# Patient Record
Sex: MALE | Race: WHITE | Employment: STUDENT | URBAN - METROPOLITAN AREA
[De-identification: names, ages, dates, MRNs, and addresses within clinical notes are randomized per-mention and may not be internally consistent; named-entity substitution may affect disease eponyms.]

---
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
## 2025-01-20 ENCOUNTER — E-ADVICE (OUTPATIENT)
Dept: ADMINISTRATIVE | Age: 20
End: 2025-01-20

## 2025-02-25 ENCOUNTER — E-ADVICE (OUTPATIENT)
Dept: ENDOCRINOLOGY | Age: 20
End: 2025-02-25

## 2025-03-04 ENCOUNTER — OFFICE VISIT (OUTPATIENT)
Dept: FAMILY MEDICINE CLINIC | Facility: CLINIC | Age: 20
End: 2025-03-04
Payer: COMMERCIAL

## 2025-03-04 VITALS
OXYGEN SATURATION: 98 % | RESPIRATION RATE: 16 BRPM | HEIGHT: 70 IN | WEIGHT: 220 LBS | BODY MASS INDEX: 31.5 KG/M2 | DIASTOLIC BLOOD PRESSURE: 84 MMHG | HEART RATE: 96 BPM | SYSTOLIC BLOOD PRESSURE: 120 MMHG

## 2025-03-04 DIAGNOSIS — E23.0 GROWTH HORMONE DEFICIENCY (HCC): ICD-10-CM

## 2025-03-04 DIAGNOSIS — F41.9 ANXIETY: ICD-10-CM

## 2025-03-04 DIAGNOSIS — J02.9 ACUTE PHARYNGITIS, UNSPECIFIED ETIOLOGY: ICD-10-CM

## 2025-03-04 DIAGNOSIS — E03.9 HYPOTHYROIDISM, UNSPECIFIED TYPE: ICD-10-CM

## 2025-03-04 DIAGNOSIS — J01.10 ACUTE NON-RECURRENT FRONTAL SINUSITIS: Primary | ICD-10-CM

## 2025-03-04 DIAGNOSIS — J01.10 ACUTE NON-RECURRENT FRONTAL SINUSITIS: ICD-10-CM

## 2025-03-04 LAB
CONTROL LINE PRESENT WITH A CLEAR BACKGROUND (YES/NO): YES YES/NO
KIT LOT #: NORMAL NUMERIC
STREP GRP A CUL-SCR: NEGATIVE

## 2025-03-04 PROCEDURE — 87880 STREP A ASSAY W/OPTIC: CPT | Performed by: NURSE PRACTITIONER

## 2025-03-04 PROCEDURE — 3079F DIAST BP 80-89 MM HG: CPT | Performed by: NURSE PRACTITIONER

## 2025-03-04 PROCEDURE — 3008F BODY MASS INDEX DOCD: CPT | Performed by: NURSE PRACTITIONER

## 2025-03-04 PROCEDURE — 99214 OFFICE O/P EST MOD 30 MIN: CPT | Performed by: NURSE PRACTITIONER

## 2025-03-04 PROCEDURE — 3074F SYST BP LT 130 MM HG: CPT | Performed by: NURSE PRACTITIONER

## 2025-03-04 RX ORDER — FLUTICASONE PROPIONATE 50 MCG
2 SPRAY, SUSPENSION (ML) NASAL DAILY
Qty: 16 G | Refills: 0 | Status: SHIPPED | OUTPATIENT
Start: 2025-03-04 | End: 2026-02-27

## 2025-03-04 RX ORDER — FLUTICASONE PROPIONATE 50 MCG
2 SPRAY, SUSPENSION (ML) NASAL DAILY
Qty: 48 G | Refills: 0 | OUTPATIENT
Start: 2025-03-04

## 2025-03-04 RX ORDER — FLUOXETINE 10 MG/1
10 CAPSULE ORAL DAILY
Qty: 90 CAPSULE | Refills: 3 | Status: SHIPPED | OUTPATIENT
Start: 2025-03-04 | End: 2025-04-03

## 2025-03-04 RX ORDER — LEVOTHYROXINE SODIUM 137 UG/1
137 TABLET ORAL DAILY
COMMUNITY
Start: 2025-02-07

## 2025-03-04 NOTE — PROGRESS NOTES
Rai Calderon is a 19 year old male.   Chief Complaint   Patient presents with    Sinusitis     Sinus drainage and pressure and body aches for 4 days      HPI:    Symptoms started    4 days ago with purulent nasal discharge, frontal sinus pressure, and headache, a lot of  post-nasal drip.Worsening since the onset Sore throat     Allergies:  Allergies[1]       Current Outpatient Medications   Medication Sig Dispense Refill    levothyroxine 137 MCG Oral Tab Take 137 mcg by mouth daily.      FLUOXETINE 20 MG Oral Cap TAKE 1 CAPSULE(20 MG) BY MOUTH DAILY IN ADDITION TO FLUOXETINE 10 MG 90 capsule 1    FLUOXETINE 10 MG Oral Cap TAKE 1 CAPSULE(10 MG) BY MOUTH DAILY IN ADDITION TO FLUOXETINE 20 MG 90 capsule 1    GENOTROPIN MINIQUICK 1 MG Subcutaneous Prefilled Syringe Inject into the skin at bedtime.      loratadine 10 MG Oral Tab Take 1 tablet (10 mg total) by mouth daily.      Levothyroxine Sodium 112 MCG Oral Tab Take 125 mcg by mouth daily. 125 mcg per Endo        Past Medical History:    Allergic rhinitis    Anxiety    Growth hormone deficiency (HCC)    Hearing impairment    Hypothyroidism    Seasonal allergies    Unspecified disorder of thyroid      Past Surgical History:   Procedure Laterality Date    Adenoidectomy      twice    Myringotomy, laser-assisted      8th procedure    Other      sinus drainage at the time of tube insertion and repeat adenoidectomy    Other surgical history      ear surgery 3/31/15    Tonsillectomy              ROS:   GENERAL HEALTH: otherwise feels well  EYES: no visual complaints or deficits  RESPIRATORY: no shortness of breath, wheezing   CARDIOVASCULAR: no chest pain or SOB.  GI: denies nausea, vomiting.  PSYCHE: no symptoms of depression or anxiety      PE:  /84   Pulse 96   Resp 16   Ht 5' 10\" (1.778 m)   Wt 220 lb (99.8 kg)   SpO2 98%   BMI 31.57 kg/m²   General: WD/WN in no acute distress.   Eyes & ears: conjunctiva clear, sclera white; TM’s non-bulging without  erythema.   Sinuses  frontal: tender to percussion.   Nares: red mucosa and thick yellow discharge, no septal deviations.   Mouth: moist with mild erythema, no exudates, and + PND.   Neck: Supple with no cervical LAD.   Lungs: Clear to auscultation bilaterally; no wheeze, rhonchi, or rales.    Heart: S1/S2 regular no murmurs.            ASSESSMENT/ PLAN:     Diagnoses and all orders for this visit:    Acute non-recurrent frontal sinusitis  -     amoxicillin clavulanate 875-125 MG Oral Tab; Take 1 tablet by mouth 2 (two) times daily for 10 days.  -     fluticasone propionate 50 MCG/ACT Nasal Suspension; 2 sprays by Each Nare route daily.    Anxiety  -     FLUoxetine 20 MG Oral Cap; Take 1 capsule (20 mg total) by mouth daily.  -     FLUoxetine 10 MG Oral Cap; Take 1 capsule (10 mg total) by mouth daily.    Growth hormone deficiency (HCC)  -     Endocrine Referral - In Network    Hypothyroidism, unspecified type  -     Endocrine Referral - In Network    Acute pharyngitis, unspecified etiology  -     Rapid Strep         ICD-10-CM         Increase fluids, rest, Tylenol or Ibuprofen prn, f/u in 5 days if not better.                [1]   Allergies  Allergen Reactions    Ciprofloxacin-Dexamethasone NAUSEA AND VOMITING    Barley      Positive allergy test    Oatmeal      Positive allergy test    Shellfish-Derived Products      vomits    Sulfa Antibiotics HIVES

## 2025-03-29 DIAGNOSIS — J01.10 ACUTE NON-RECURRENT FRONTAL SINUSITIS: ICD-10-CM

## 2025-03-31 RX ORDER — FLUTICASONE PROPIONATE 50 MCG
2 SPRAY, SUSPENSION (ML) NASAL DAILY
Qty: 16 G | Refills: 0 | OUTPATIENT
Start: 2025-03-31

## 2025-05-13 ENCOUNTER — EXTERNAL LAB (OUTPATIENT)
Dept: HEALTH INFORMATION MANAGEMENT | Facility: OTHER | Age: 20
End: 2025-05-13

## 2025-05-13 ENCOUNTER — LAB ENCOUNTER (OUTPATIENT)
Dept: LAB | Age: 20
End: 2025-05-13
Attending: INTERNAL MEDICINE
Payer: COMMERCIAL

## 2025-05-13 DIAGNOSIS — E23.0 PANHYPOPITUITARISM (HCC): Primary | ICD-10-CM

## 2025-05-13 DIAGNOSIS — E03.8 TOXIC DIFFUSE GOITER WITH PRETIBIAL MYXEDEMA: ICD-10-CM

## 2025-05-13 DIAGNOSIS — E05.00 TOXIC DIFFUSE GOITER WITH PRETIBIAL MYXEDEMA: ICD-10-CM

## 2025-05-13 LAB
ALBUMIN SERPL-MCNC: 5 G/DL (ref 3.2–4.8)
ALBUMIN/GLOB SERPL: 2.4 {RATIO} (ref 1–2)
ALP LIVER SERPL-CCNC: 74 U/L (ref 45–117)
ALT SERPL-CCNC: 21 U/L (ref 10–49)
ANION GAP SERPL CALC-SCNC: 8 MMOL/L (ref 0–18)
AST SERPL-CCNC: 27 U/L (ref ?–34)
BILIRUB SERPL-MCNC: 0.9 MG/DL (ref 0.3–1.2)
BUN BLD-MCNC: 9 MG/DL (ref 9–23)
CALCIUM BLD-MCNC: 10.2 MG/DL (ref 8.7–10.6)
CHLORIDE SERPL-SCNC: 106 MMOL/L (ref 98–112)
CHOLEST SERPL-MCNC: 167 MG/DL (ref ?–200)
CO2 SERPL-SCNC: 26 MMOL/L (ref 21–32)
CREAT BLD-MCNC: 1.02 MG/DL (ref 0.7–1.3)
EGFRCR SERPLBLD CKD-EPI 2021: 109 ML/MIN/1.73M2 (ref 60–?)
EST. AVERAGE GLUCOSE BLD GHB EST-MCNC: 105 MG/DL (ref 68–126)
FASTING PATIENT LIPID ANSWER: YES
FASTING STATUS PATIENT QL REPORTED: YES
GLOBULIN PLAS-MCNC: 2.1 G/DL (ref 2–3.5)
GLUCOSE BLD-MCNC: 98 MG/DL (ref 70–99)
HBA1C MFR BLD: 5.3 % (ref ?–5.7)
HDLC SERPL-MCNC: 61 MG/DL (ref 40–59)
LAB RESULT: NORMAL
LDLC SERPL CALC-MCNC: 93 MG/DL (ref ?–100)
NONHDLC SERPL-MCNC: 106 MG/DL (ref ?–130)
OSMOLALITY SERPL CALC.SUM OF ELEC: 289 MOSM/KG (ref 275–295)
POTASSIUM SERPL-SCNC: 3.9 MMOL/L (ref 3.5–5.1)
PROT SERPL-MCNC: 7.1 G/DL (ref 5.7–8.2)
SODIUM SERPL-SCNC: 140 MMOL/L (ref 136–145)
T4 FREE SERPL-MCNC: 2 NG/DL (ref 0.9–1.6)
TRIGL SERPL-MCNC: 68 MG/DL (ref 30–149)
VLDLC SERPL CALC-MCNC: 11 MG/DL (ref 0–30)

## 2025-05-13 PROCEDURE — 84439 ASSAY OF FREE THYROXINE: CPT

## 2025-05-13 PROCEDURE — 80053 COMPREHEN METABOLIC PANEL: CPT

## 2025-05-13 PROCEDURE — 36415 COLL VENOUS BLD VENIPUNCTURE: CPT

## 2025-05-13 PROCEDURE — 83036 HEMOGLOBIN GLYCOSYLATED A1C: CPT

## 2025-05-13 PROCEDURE — 80061 LIPID PANEL: CPT

## 2025-05-13 PROCEDURE — 84305 ASSAY OF SOMATOMEDIN: CPT

## 2025-05-16 ENCOUNTER — APPOINTMENT (OUTPATIENT)
Dept: ENDOCRINOLOGY | Age: 20
End: 2025-05-16

## 2025-05-16 VITALS
HEIGHT: 70 IN | BODY MASS INDEX: 27.95 KG/M2 | OXYGEN SATURATION: 100 % | WEIGHT: 195.22 LBS | SYSTOLIC BLOOD PRESSURE: 131 MMHG | HEART RATE: 90 BPM | DIASTOLIC BLOOD PRESSURE: 70 MMHG | TEMPERATURE: 98.3 F

## 2025-05-16 DIAGNOSIS — E03.9 HYPOTHYROIDISM, UNSPECIFIED TYPE: ICD-10-CM

## 2025-05-16 DIAGNOSIS — E23.0 PITUITARY DWARFISM  (CMD): ICD-10-CM

## 2025-05-16 DIAGNOSIS — E03.8 SECONDARY HYPOTHYROIDISM: ICD-10-CM

## 2025-05-16 DIAGNOSIS — E23.0 GROWTH HORMONE DEFICIENCY  (CMD): ICD-10-CM

## 2025-05-16 DIAGNOSIS — E23.0: Primary | ICD-10-CM

## 2025-05-16 LAB
IGF I: 459 NG/ML
IGF-1, Z SCORE: 2 S.D.

## 2025-05-16 PROCEDURE — 99214 OFFICE O/P EST MOD 30 MIN: CPT | Performed by: INTERNAL MEDICINE

## 2025-05-16 PROCEDURE — G2211 COMPLEX E/M VISIT ADD ON: HCPCS | Performed by: INTERNAL MEDICINE

## 2025-05-16 RX ORDER — LEVOTHYROXINE SODIUM 137 UG/1
137 TABLET ORAL DAILY
Qty: 90 TABLET | Refills: 1 | Status: SHIPPED | OUTPATIENT
Start: 2025-05-16

## 2025-05-16 RX ORDER — LEVOTHYROXINE SODIUM 137 UG/1
137 TABLET ORAL DAILY
Qty: 90 TABLET | Refills: 1 | Status: SHIPPED | OUTPATIENT
Start: 2025-05-16 | End: 2025-05-16 | Stop reason: SDUPTHER

## 2025-06-27 ENCOUNTER — EXTERNAL LAB (OUTPATIENT)
Dept: HEALTH INFORMATION MANAGEMENT | Facility: OTHER | Age: 20
End: 2025-06-27

## 2025-06-27 ENCOUNTER — LAB ENCOUNTER (OUTPATIENT)
Dept: LAB | Age: 20
End: 2025-06-27
Attending: INTERNAL MEDICINE
Payer: COMMERCIAL

## 2025-06-27 DIAGNOSIS — E03.8 SECONDARY HYPOTHYROIDISM: Primary | ICD-10-CM

## 2025-06-27 LAB
LAB RESULT: NORMAL
T4 FREE SERPL-MCNC: 0.9 NG/DL (ref 0.9–1.6)
T4 FREE SERPL-MCNC: 0.9 NG/DL (ref 0.9–1.6)
TSH SERPL-ACNC: 0.04 UIU/ML (ref 0.48–4.17)
TSI SER-ACNC: 0.04 UIU/ML (ref 0.48–4.17)

## 2025-06-27 PROCEDURE — 36415 COLL VENOUS BLD VENIPUNCTURE: CPT

## 2025-06-27 PROCEDURE — 84305 ASSAY OF SOMATOMEDIN: CPT

## 2025-06-27 PROCEDURE — 84439 ASSAY OF FREE THYROXINE: CPT

## 2025-06-27 PROCEDURE — 84443 ASSAY THYROID STIM HORMONE: CPT

## 2025-07-02 LAB
IGF I: 284 NG/ML
IGF-1, Z SCORE: 0.2 S.D.

## 2025-07-03 ENCOUNTER — RESULTS FOLLOW-UP (OUTPATIENT)
Dept: PEDIATRIC ENDOCRINOLOGY | Age: 20
End: 2025-07-03

## 2025-07-03 DIAGNOSIS — E03.9 HYPOTHYROIDISM, UNSPECIFIED TYPE: Primary | ICD-10-CM

## 2025-07-03 DIAGNOSIS — E23.0 GROWTH HORMONE DEFICIENCY  (CMD): ICD-10-CM

## 2025-08-30 DIAGNOSIS — E23.0: ICD-10-CM

## 2025-09-02 RX ORDER — SOMATROPIN 10 MG/1.5ML
INJECTION, SOLUTION SUBCUTANEOUS
Qty: 3 ML | Refills: 1 | Status: SHIPPED | OUTPATIENT
Start: 2025-09-02

## 2025-11-21 ENCOUNTER — APPOINTMENT (OUTPATIENT)
Dept: ENDOCRINOLOGY | Age: 20
End: 2025-11-21

## (undated) NOTE — Clinical Note
Dear Dr. Ryan Mcmahon,  Thank you for referring Vitor Dixon to the [facility] in [place]. Sincerely,  Lee Vizcarra NP

## (undated) NOTE — LETTER
Date: 5/29/2019    Patient Name: Melina Gilliam          To Whom it may concern: The above patient was seen at the [facility] for treatment of a medical condition.     .    The patient may return to work/school 5/29/2019 with no restrictions

## (undated) NOTE — MR AVS SNAPSHOT
[address] [address] [phone]               Thank you for choosing us for your health care visit with OSIEL Erickson.   We are glad to serve you and happy to provide you with this summary of y · Know that pet hair and dander can cause flare-ups. Seeking medical treatment  Another way to keep symptoms under control is to seek medical treatment. Talk with your health care provider about the type of treatment that may work best for you.  A topical *Growth percentiles are based on CDC 2-20 Years data     BP percentiles are based on 2000 NHANES data         Current Medications          This list is accurate as of: 6/8/17 11:45 AM.  Always use your most recent med list.                Aldo Armstrong often. Sometimes toddlers need to try a food 10 times before they actually accept and enjoy it. It is also important to encourage play time as soon as they start crawling and walking.  As your children grow, continue to help them live a healthy active lifes

## (undated) NOTE — LETTER
Date: 11/29/2017    Patient Name: Kimberly Edward          To Whom it may concern: The above patient was seen at the [facility] for treatment of a medical condition.     This patient should be excused from attending school from 11/29/17 thr

## (undated) NOTE — LETTER
Date: 10/27/2023    Patient Name: Mp Diaz          To Whom it may concern: This letter has been written at the patient's request. The above patient was seen at the [facility] for treatment of a medical condition. This patient should be excused from attending work/school from today due to medical reasons.             Sincerely,    Ismael Beckman PA-C

## (undated) NOTE — LETTER
Date: 12/18/2018    Patient Name: Jefferson Estrada          To Whom it may concern: The above patient was seen at the [facility] for treatment of a medical condition.     This patient should be excused from attending work/school from 12/18/201

## (undated) NOTE — LETTER
Date: 10/28/2019    Patient Name: Leland Washington          To Whom it may concern: This letter has been written at the patient's request. The above patient was seen at the [facility] for treatment of a medical condition.     This patient shoul

## (undated) NOTE — LETTER
Date: 8/24/2018    Patient Name: Willie Coreas          To Whom it may concern: This letter has been written at the patient's request. The above patient was seen at the [facility] for treatment of a medical condition.     This patient [place]

## (undated) NOTE — LETTER
Date: 9/4/2024    Patient Name: Rai Calderon          To Whom it may concern:    This letter has been written at the patient's request. The above patient was seen at [facility] for treatment of a medical condition.    This patient should be excused from attending work/school on 09/04/2024.    The patient may return to work/school starting on 09/05/2024 if fever free for 24 hours and symptoms improving over the last 24 hours.        Sincerely,          DAY Galloway

## (undated) NOTE — LETTER
Date: 2019    Patient Name: Whitney Kamron VINSON 2005        To Whom it may concern:        Alicia Whiting takes the below two medication:     Montelukast 4mg chew tablet nightly    Over the Counter Allergy Eye drops, 2 drops in

## (undated) NOTE — ED AVS SNAPSHOT
Deborah Mcnamara   MRN: [id number]    Department:  THE [facility] Emergency Department in [place]   Date of Visit:  6/13/2018           Disclosure     Insurance plans vary and the physician(s) referred by the ER may not be covered by your plan.  Please contact tell this physician (or your personal doctor if your instructions are to return to your personal doctor) about any new or lasting problems. The primary care or specialist physician will see patients referred from the [facility] Emergency Department.  Johnnie Bird